# Patient Record
Sex: FEMALE | Race: WHITE | NOT HISPANIC OR LATINO | Employment: FULL TIME | URBAN - METROPOLITAN AREA
[De-identification: names, ages, dates, MRNs, and addresses within clinical notes are randomized per-mention and may not be internally consistent; named-entity substitution may affect disease eponyms.]

---

## 2023-06-28 ENCOUNTER — OFFICE VISIT (OUTPATIENT)
Dept: PRIMARY CARE | Facility: CLINIC | Age: 53
End: 2023-06-28
Payer: COMMERCIAL

## 2023-06-28 VITALS
DIASTOLIC BLOOD PRESSURE: 82 MMHG | BODY MASS INDEX: 27.48 KG/M2 | HEIGHT: 60 IN | HEART RATE: 83 BPM | SYSTOLIC BLOOD PRESSURE: 110 MMHG | OXYGEN SATURATION: 97 % | TEMPERATURE: 98.2 F | WEIGHT: 140 LBS | RESPIRATION RATE: 16 BRPM

## 2023-06-28 DIAGNOSIS — Z00.00 ENCOUNTER FOR ANNUAL PHYSICAL EXAM: Primary | ICD-10-CM

## 2023-06-28 DIAGNOSIS — M79.7 FIBROMYALGIA: ICD-10-CM

## 2023-06-28 DIAGNOSIS — Z91.09 ENVIRONMENTAL ALLERGIES: ICD-10-CM

## 2023-06-28 DIAGNOSIS — Z82.49 FAMILY HISTORY OF EARLY CAD: ICD-10-CM

## 2023-06-28 DIAGNOSIS — M85.80 OSTEOPENIA, UNSPECIFIED LOCATION: ICD-10-CM

## 2023-06-28 DIAGNOSIS — D12.6 ADENOMATOUS POLYP OF COLON, UNSPECIFIED PART OF COLON: ICD-10-CM

## 2023-06-28 DIAGNOSIS — R53.83 OTHER FATIGUE: ICD-10-CM

## 2023-06-28 PROCEDURE — 99396 PREV VISIT EST AGE 40-64: CPT | Performed by: FAMILY MEDICINE

## 2023-06-28 PROCEDURE — 1036F TOBACCO NON-USER: CPT | Performed by: FAMILY MEDICINE

## 2023-06-28 PROCEDURE — 99214 OFFICE O/P EST MOD 30 MIN: CPT | Performed by: FAMILY MEDICINE

## 2023-06-28 RX ORDER — ESTRADIOL 0.1 MG/G
2 CREAM VAGINAL DAILY
COMMUNITY

## 2023-06-28 RX ORDER — ASPIRIN 325 MG
1000 TABLET, DELAYED RELEASE (ENTERIC COATED) ORAL DAILY
COMMUNITY

## 2023-06-28 RX ORDER — GUAIFENESIN AND PHENYLEPHRINE HCL 400; 10 MG/1; MG/1
1 TABLET ORAL DAILY
COMMUNITY

## 2023-06-28 RX ORDER — ACETAMINOPHEN 500 MG
1 TABLET ORAL DAILY
COMMUNITY

## 2023-06-28 RX ORDER — CHOLECALCIFEROL (VITAMIN D3) 25 MCG
2500 TABLET,CHEWABLE ORAL DAILY
COMMUNITY

## 2023-06-28 RX ORDER — ASCORBIC ACID 500 MG
500 TABLET ORAL DAILY
COMMUNITY

## 2023-06-28 RX ORDER — LANOLIN ALCOHOL/MO/W.PET/CERES
100 CREAM (GRAM) TOPICAL 2 TIMES DAILY
COMMUNITY
End: 2024-05-28 | Stop reason: ALTCHOICE

## 2023-06-28 RX ORDER — IBUPROFEN 200 MG
200 TABLET ORAL EVERY 6 HOURS PRN
COMMUNITY

## 2023-06-28 RX ORDER — FLUTICASONE PROPIONATE 50 MCG
1 SPRAY, SUSPENSION (ML) NASAL DAILY
COMMUNITY

## 2023-06-28 ASSESSMENT — PATIENT HEALTH QUESTIONNAIRE - PHQ9
10. IF YOU CHECKED OFF ANY PROBLEMS, HOW DIFFICULT HAVE THESE PROBLEMS MADE IT FOR YOU TO DO YOUR WORK, TAKE CARE OF THINGS AT HOME, OR GET ALONG WITH OTHER PEOPLE: NOT DIFFICULT AT ALL
7. TROUBLE CONCENTRATING ON THINGS, SUCH AS READING THE NEWSPAPER OR WATCHING TELEVISION: NOT AT ALL
3. TROUBLE FALLING OR STAYING ASLEEP OR SLEEPING TOO MUCH: SEVERAL DAYS
8. MOVING OR SPEAKING SO SLOWLY THAT OTHER PEOPLE COULD HAVE NOTICED. OR THE OPPOSITE, BEING SO FIGETY OR RESTLESS THAT YOU HAVE BEEN MOVING AROUND A LOT MORE THAN USUAL: NOT AT ALL
1. LITTLE INTEREST OR PLEASURE IN DOING THINGS: NOT AT ALL
4. FEELING TIRED OR HAVING LITTLE ENERGY: NOT AT ALL
6. FEELING BAD ABOUT YOURSELF - OR THAT YOU ARE A FAILURE OR HAVE LET YOURSELF OR YOUR FAMILY DOWN: NOT AT ALL
9. THOUGHTS THAT YOU WOULD BE BETTER OFF DEAD, OR OF HURTING YOURSELF: NOT AT ALL
SUM OF ALL RESPONSES TO PHQ QUESTIONS 1-9: 1
5. POOR APPETITE OR OVEREATING: NOT AT ALL
SUM OF ALL RESPONSES TO PHQ9 QUESTIONS 1 AND 2: 0
2. FEELING DOWN, DEPRESSED OR HOPELESS: NOT AT ALL

## 2023-06-28 ASSESSMENT — ANXIETY QUESTIONNAIRES
7. FEELING AFRAID AS IF SOMETHING AWFUL MIGHT HAPPEN: NOT AT ALL
2. NOT BEING ABLE TO STOP OR CONTROL WORRYING: NOT AT ALL
IF YOU CHECKED OFF ANY PROBLEMS ON THIS QUESTIONNAIRE, HOW DIFFICULT HAVE THESE PROBLEMS MADE IT FOR YOU TO DO YOUR WORK, TAKE CARE OF THINGS AT HOME, OR GET ALONG WITH OTHER PEOPLE: NOT DIFFICULT AT ALL
4. TROUBLE RELAXING: SEVERAL DAYS
5. BEING SO RESTLESS THAT IT IS HARD TO SIT STILL: SEVERAL DAYS
6. BECOMING EASILY ANNOYED OR IRRITABLE: SEVERAL DAYS
1. FEELING NERVOUS, ANXIOUS, OR ON EDGE: NOT AT ALL
3. WORRYING TOO MUCH ABOUT DIFFERENT THINGS: NOT AT ALL
GAD7 TOTAL SCORE: 3

## 2023-06-28 NOTE — PROGRESS NOTES
Subjective   Bekcy Cox is a 53 y.o. female who presents for Establish Care.  HPI  PMH:   Pap NIL neg HPV 1/2020  Colon polyps, c-scope 1/2019 rpt 5 yr   Mammos GYN   DEXA 3/2023 T 1.5   Fibromyalgia Dx 20 yrs ago   Dad-MI 42  , Damien   Mental health counselor     Gluten free which has helped. Wonders if she has food allergies.   Walks regularly.   Manages fibro w supp, went to [facility] med and had supp adjustments in 2020.   Mood is good w self care techniques     Current Outpatient Medications on File Prior to Visit   Medication Sig Dispense Refill    ascorbic acid (Vitamin C) 500 mg tablet Take 1 tablet (500 mg) by mouth once daily.      cholecalciferol (Vitamin D3) 5,000 Units tablet Take 1 tablet (5,000 Units) by mouth once daily.      COENZYME Q10-L-CARNITINE ORAL Take 1 tablet by mouth once daily.      cyanocobalamin (Vitamin B-12) 2,500 mcg tablet Take 1 tablet (2,500 mcg) by mouth once daily.      diphenhydrAMINE HCL 12.5 mg tablet,disintegrating Take 1 tablet by mouth once daily.      estradiol (Estrace) 0.01 % (0.1 mg/gram) vaginal cream Insert 2 g into the vagina once daily.      fluticasone (Flonase) 50 mcg/actuation nasal spray Administer 1 spray into each nostril once daily. Shake gently. Before first use, prime pump. After use, clean tip and replace cap.      ibuprofen (AdviL) 200 mg tablet Take 1 tablet (200 mg) by mouth every 6 hours if needed for mild pain (1 - 3).      magnesium glycinate-mag oxide 120 mg magnesium capsule Take 1 capsule by mouth once daily.      omega-3 (Fish Oil) 60- mg capsule Take 2 capsules (1,000 mg) by mouth once daily.      thiamine 100 mg tablet Take 1 tablet (100 mg) by mouth 2 times a day.      turmeric root extract 500 mg capsule Take 1 Capful by mouth once daily.      ubidecarenone (COENZYME Q10, BULK, MISC) Take 1 capsule by mouth once daily.      vit C/zinc citrate/elderberry (ELDERBERRY IMMUNE HEALTH ORAL) Take 1 capsule by mouth once daily.        No current facility-administered medications on file prior to visit.        Patient Health Questionnaire-9 Score: 1           Objective   /82   Pulse 83   Temp 36.8 °C (98.2 °F)   Resp 16   Ht 1.524 m (5')   Wt 63.5 kg (140 lb)   SpO2 97%   BMI 27.34 kg/m²    Physical Exam  General: NAD  HEENT:NCAT, PERRLA, nml OP, b/l TM clear   Neck: no cervical MARSHALL  Heart: RRR no murmur, no edema   Lungs: CTA b/l, no wheeze or rhonchi   GI: abd soft, nontender, nondistended.   MSK: no c/c/e. nml gait   Skin: warm and dry  Psych: cooperative, appropriate affect  Neuro: speech clear. A&Ox3  Assessment/Plan   Problem List Items Addressed This Visit    None  Visit Diagnoses       Encounter for annual physical exam    -  Primary  CPE:overall doing well. Cont w balanced diet/reg ex   BMI: 27  VACC: reviewed, rec shingrix-considering tdap UTD   COLON CA SCRN: due 1/2024, call for RF   LABS: ordered   PAP: UTD rpt 1/2025  MAMMO: UTD per gyn   DEXA: 65  RTC yearly or prn for CPE w labs prior     Relevant Orders    Lipid Panel    CBC and Auto Differential    Comprehensive Metabolic Panel    Hemoglobin A1C    TSH with reflex to Free T4 if abnormal    Fibromyalgia      -stable w out meds  -reviewed supp. May not need addtl vit D and coEzQ 10     Family history of early CAD      -baseline Ca score d/t Fhx     Relevant Orders    CT cardiac scoring wo IV contrast    Osteopenia, unspecified location      -rec Ca 1200 mg in addition to already taking vit D     Environmental allergies      RF allergist for testing    Relevant Orders    Referral to Allergy    Adenomatous polyp of colon, unspecified part of colon      See above     Other fatigue      Check labs since on several vit     Relevant Orders    Vitamin D 25 hydroxy    Vitamin B12    Magnesium

## 2023-09-25 ENCOUNTER — LAB (OUTPATIENT)
Dept: LAB | Facility: LAB | Age: 53
End: 2023-09-25
Payer: COMMERCIAL

## 2023-09-25 ENCOUNTER — TELEPHONE (OUTPATIENT)
Dept: PRIMARY CARE | Facility: CLINIC | Age: 53
End: 2023-09-25

## 2023-09-25 DIAGNOSIS — Z00.00 ENCOUNTER FOR ANNUAL PHYSICAL EXAM: ICD-10-CM

## 2023-09-25 DIAGNOSIS — R53.83 OTHER FATIGUE: ICD-10-CM

## 2023-09-25 LAB
ALANINE AMINOTRANSFERASE (SGPT) (U/L) IN SER/PLAS: 21 U/L (ref 7–45)
ALBUMIN (G/DL) IN SER/PLAS: 4.3 G/DL (ref 3.4–5)
ALKALINE PHOSPHATASE (U/L) IN SER/PLAS: 69 U/L (ref 33–110)
ANION GAP IN SER/PLAS: 13 MMOL/L (ref 10–20)
ASPARTATE AMINOTRANSFERASE (SGOT) (U/L) IN SER/PLAS: 17 U/L (ref 9–39)
BASOPHILS (10*3/UL) IN BLOOD BY AUTOMATED COUNT: 0.06 X10E9/L (ref 0–0.1)
BASOPHILS/100 LEUKOCYTES IN BLOOD BY AUTOMATED COUNT: 0.7 % (ref 0–2)
BILIRUBIN TOTAL (MG/DL) IN SER/PLAS: 0.6 MG/DL (ref 0–1.2)
CALCIDIOL (25 OH VITAMIN D3) (NG/ML) IN SER/PLAS: 89 NG/ML
CALCIUM (MG/DL) IN SER/PLAS: 9.7 MG/DL (ref 8.6–10.6)
CARBON DIOXIDE, TOTAL (MMOL/L) IN SER/PLAS: 25 MMOL/L (ref 21–32)
CHLORIDE (MMOL/L) IN SER/PLAS: 107 MMOL/L (ref 98–107)
CHOLESTEROL (MG/DL) IN SER/PLAS: 231 MG/DL (ref 0–199)
CHOLESTEROL IN HDL (MG/DL) IN SER/PLAS: 41.5 MG/DL
CHOLESTEROL/HDL RATIO: 5.6
CREATININE (MG/DL) IN SER/PLAS: 0.92 MG/DL (ref 0.5–1.05)
EOSINOPHILS (10*3/UL) IN BLOOD BY AUTOMATED COUNT: 0.24 X10E9/L (ref 0–0.7)
EOSINOPHILS/100 LEUKOCYTES IN BLOOD BY AUTOMATED COUNT: 2.9 % (ref 0–6)
ERYTHROCYTE DISTRIBUTION WIDTH (RATIO) BY AUTOMATED COUNT: 12.6 % (ref 11.5–14.5)
ERYTHROCYTE MEAN CORPUSCULAR HEMOGLOBIN CONCENTRATION (G/DL) BY AUTOMATED: 32 G/DL (ref 32–36)
ERYTHROCYTE MEAN CORPUSCULAR VOLUME (FL) BY AUTOMATED COUNT: 95 FL (ref 80–100)
ERYTHROCYTES (10*6/UL) IN BLOOD BY AUTOMATED COUNT: 4.82 X10E12/L (ref 4–5.2)
ESTIMATED AVERAGE GLUCOSE FOR HBA1C: 100 MG/DL
GFR FEMALE: 73 ML/MIN/1.73M2
GLUCOSE (MG/DL) IN SER/PLAS: 99 MG/DL (ref 74–99)
HEMATOCRIT (%) IN BLOOD BY AUTOMATED COUNT: 46 % (ref 36–46)
HEMOGLOBIN (G/DL) IN BLOOD: 14.7 G/DL (ref 12–16)
HEMOGLOBIN A1C/HEMOGLOBIN TOTAL IN BLOOD: 5.1 %
IMMATURE GRANULOCYTES/100 LEUKOCYTES IN BLOOD BY AUTOMATED COUNT: 0.4 % (ref 0–0.9)
LDL: 165 MG/DL (ref 0–99)
LEUKOCYTES (10*3/UL) IN BLOOD BY AUTOMATED COUNT: 8.2 X10E9/L (ref 4.4–11.3)
LYMPHOCYTES (10*3/UL) IN BLOOD BY AUTOMATED COUNT: 2.39 X10E9/L (ref 1.2–4.8)
LYMPHOCYTES/100 LEUKOCYTES IN BLOOD BY AUTOMATED COUNT: 29.2 % (ref 13–44)
MAGNESIUM (MG/DL) IN SER/PLAS: 2.37 MG/DL (ref 1.6–2.4)
MONOCYTES (10*3/UL) IN BLOOD BY AUTOMATED COUNT: 0.6 X10E9/L (ref 0.1–1)
MONOCYTES/100 LEUKOCYTES IN BLOOD BY AUTOMATED COUNT: 7.3 % (ref 2–10)
NEUTROPHILS (10*3/UL) IN BLOOD BY AUTOMATED COUNT: 4.87 X10E9/L (ref 1.2–7.7)
NEUTROPHILS/100 LEUKOCYTES IN BLOOD BY AUTOMATED COUNT: 59.5 % (ref 40–80)
NRBC (PER 100 WBCS) BY AUTOMATED COUNT: 0 /100 WBC (ref 0–0)
PLATELETS (10*3/UL) IN BLOOD AUTOMATED COUNT: 268 X10E9/L (ref 150–450)
POTASSIUM (MMOL/L) IN SER/PLAS: 4.3 MMOL/L (ref 3.5–5.3)
PROTEIN TOTAL: 6.8 G/DL (ref 6.4–8.2)
SODIUM (MMOL/L) IN SER/PLAS: 141 MMOL/L (ref 136–145)
THYROTROPIN (MIU/L) IN SER/PLAS BY DETECTION LIMIT <= 0.05 MIU/L: 1.92 MIU/L (ref 0.44–3.98)
TRIGLYCERIDE (MG/DL) IN SER/PLAS: 121 MG/DL (ref 0–149)
UREA NITROGEN (MG/DL) IN SER/PLAS: 15 MG/DL (ref 6–23)
VLDL: 24 MG/DL (ref 0–40)

## 2023-09-25 PROCEDURE — 80061 LIPID PANEL: CPT

## 2023-09-25 PROCEDURE — 83735 ASSAY OF MAGNESIUM: CPT

## 2023-09-25 PROCEDURE — 82306 VITAMIN D 25 HYDROXY: CPT

## 2023-09-25 PROCEDURE — 84443 ASSAY THYROID STIM HORMONE: CPT

## 2023-09-25 PROCEDURE — 36415 COLL VENOUS BLD VENIPUNCTURE: CPT

## 2023-09-25 PROCEDURE — 83036 HEMOGLOBIN GLYCOSYLATED A1C: CPT

## 2023-09-25 PROCEDURE — 80053 COMPREHEN METABOLIC PANEL: CPT

## 2023-09-25 PROCEDURE — 85025 COMPLETE CBC W/AUTO DIFF WBC: CPT

## 2023-09-25 NOTE — TELEPHONE ENCOUNTER
Ca score is elevated but not severely. We need to discuss this further at an appt. Pls [facility] appt

## 2023-09-25 NOTE — TELEPHONE ENCOUNTER
Spoke with Pt and advised per Dr. Behm: Ca score is elevated but not severely. We need to discuss this further at an appt. Pls [facility] appt      Pt expressed understanding and was scheduled for an apt 10/12/2023

## 2023-10-12 ENCOUNTER — OFFICE VISIT (OUTPATIENT)
Dept: PRIMARY CARE | Facility: CLINIC | Age: 53
End: 2023-10-12
Payer: COMMERCIAL

## 2023-10-12 VITALS
WEIGHT: 139.4 LBS | OXYGEN SATURATION: 96 % | RESPIRATION RATE: 18 BRPM | TEMPERATURE: 97.8 F | HEART RATE: 78 BPM | BODY MASS INDEX: 27.37 KG/M2 | HEIGHT: 60 IN | DIASTOLIC BLOOD PRESSURE: 82 MMHG | SYSTOLIC BLOOD PRESSURE: 129 MMHG

## 2023-10-12 DIAGNOSIS — E78.2 MIXED HYPERLIPIDEMIA: Primary | ICD-10-CM

## 2023-10-12 PROCEDURE — 99214 OFFICE O/P EST MOD 30 MIN: CPT | Performed by: FAMILY MEDICINE

## 2023-10-12 PROCEDURE — 1036F TOBACCO NON-USER: CPT | Performed by: FAMILY MEDICINE

## 2023-10-12 NOTE — PROGRESS NOTES
Subjective   Becky Cox is a 53 y.o. female who presents for Follow-up (Follow up on blood work ).  HPI  Here for f/up on Ca score last mo.   Strong Fhx CAD   Trouble tolerating statin the past. Lipitor cause MAJOR body aches and could not function, caused FIBRO exac.     Current Outpatient Medications on File Prior to Visit   Medication Sig Dispense Refill    ascorbic acid (Vitamin C) 500 mg tablet Take 1 tablet (500 mg) by mouth once daily.      cholecalciferol (Vitamin D3) 5,000 Units tablet Take 1 tablet (5,000 Units) by mouth once daily.      COENZYME Q10-L-CARNITINE ORAL Take 1 tablet by mouth once daily.      cyanocobalamin (Vitamin B-12) 2,500 mcg tablet Take 1 tablet (2,500 mcg) by mouth once daily.      diphenhydrAMINE HCL 12.5 mg tablet,disintegrating Take 1 tablet by mouth once daily.      estradiol (Estrace) 0.01 % (0.1 mg/gram) vaginal cream Insert 2 g into the vagina once daily.      fluticasone (Flonase) 50 mcg/actuation nasal spray Administer 1 spray into each nostril once daily. Shake gently. Before first use, prime pump. After use, clean tip and replace cap.      ibuprofen (AdviL) 200 mg tablet Take 1 tablet (200 mg) by mouth every 6 hours if needed for mild pain (1 - 3).      magnesium glycinate-mag oxide 120 mg magnesium capsule Take 1 capsule by mouth once daily.      omega-3 (Fish Oil) 60- mg capsule Take 2 capsules (1,000 mg) by mouth once daily.      thiamine 100 mg tablet Take 1 tablet (100 mg) by mouth 2 times a day.      turmeric root extract 500 mg capsule Take 1 Capful by mouth once daily.      ubidecarenone (COENZYME Q10, BULK, MISC) Take 1 capsule by mouth once daily.      vit C/zinc citrate/elderberry (ELDERBERRY IMMUNE HEALTH ORAL) Take 1 capsule by mouth once daily.       No current facility-administered medications on file prior to visit.                  Objective   /82   Pulse 78   Temp 36.6 °C (97.8 °F)   Resp 18   Ht 1.524 m (5')   Wt 63.2 kg (139 lb  6.4 oz)   SpO2 96%   BMI 27.22 kg/m²    Physical Exam  General: NAD  HEENT:NCAT, PERRLA  Heart: RRR no murmur, no edema   Lungs: CTA b/l, no wheeze or rhonchi   MSK: no c/c/e. nml gait   Skin: warm and dry  Psych: cooperative, appropriate affect  Neuro: speech clear. A&Ox3  Assessment/Plan   Problem List Items Addressed This Visit    None  Visit Diagnoses       Mixed hyperlipidemia    -  Primary  -Ca score 225  -While not severely elevated, statin therapy is indicated based off her lipid levels and predominace of Ca score in her LAD. Very strong Fhx CAD w dad in 40s  -Statin intol and caused DEBILTATING fatigue and myalgias in past  -will start red yeast rice  -motivated to make lifestyle changes w Ex and med diet  -f/up 3 mo labs prior   -also RF to cards for lipid mgnt     Relevant Orders    Referral to Cardiology    Lipid Panel

## 2023-12-18 PROBLEM — E55.9 VITAMIN D DEFICIENCY: Status: ACTIVE | Noted: 2023-12-18

## 2023-12-18 PROBLEM — N95.2 ATROPHIC VAGINITIS: Status: ACTIVE | Noted: 2023-12-18

## 2023-12-18 PROBLEM — Z98.890 S/P BILATERAL BREAST REDUCTION: Status: ACTIVE | Noted: 2023-12-18

## 2023-12-18 PROBLEM — M54.9 BACK PAIN: Status: ACTIVE | Noted: 2023-12-18

## 2023-12-18 PROBLEM — H10.45 CHRONIC ALLERGIC CONJUNCTIVITIS: Status: ACTIVE | Noted: 2023-12-18

## 2023-12-18 PROBLEM — G93.32 CHRONIC FATIGUE SYNDROME: Status: ACTIVE | Noted: 2023-12-18

## 2023-12-18 PROBLEM — R76.8 POSITIVE ANA (ANTINUCLEAR ANTIBODY): Status: ACTIVE | Noted: 2023-12-18

## 2023-12-18 PROBLEM — E78.2 MIXED HYPERLIPIDEMIA: Status: ACTIVE | Noted: 2023-12-18

## 2023-12-18 PROBLEM — M79.7 FIBROMYALGIA: Status: ACTIVE | Noted: 2023-12-18

## 2023-12-20 ENCOUNTER — OFFICE VISIT (OUTPATIENT)
Dept: CARDIOLOGY | Facility: CLINIC | Age: 53
End: 2023-12-20
Payer: COMMERCIAL

## 2023-12-20 VITALS
HEIGHT: 60 IN | WEIGHT: 138.2 LBS | SYSTOLIC BLOOD PRESSURE: 122 MMHG | DIASTOLIC BLOOD PRESSURE: 84 MMHG | OXYGEN SATURATION: 94 % | HEART RATE: 63 BPM | BODY MASS INDEX: 27.13 KG/M2

## 2023-12-20 DIAGNOSIS — R93.1 ELEVATED CORONARY ARTERY CALCIUM SCORE: ICD-10-CM

## 2023-12-20 DIAGNOSIS — Z78.9 STATIN INTOLERANCE: ICD-10-CM

## 2023-12-20 DIAGNOSIS — E78.2 MIXED HYPERLIPIDEMIA: Primary | ICD-10-CM

## 2023-12-20 PROCEDURE — 1036F TOBACCO NON-USER: CPT | Performed by: INTERNAL MEDICINE

## 2023-12-20 PROCEDURE — 93005 ELECTROCARDIOGRAM TRACING: CPT | Performed by: INTERNAL MEDICINE

## 2023-12-20 PROCEDURE — 99214 OFFICE O/P EST MOD 30 MIN: CPT | Mod: 25 | Performed by: INTERNAL MEDICINE

## 2023-12-20 PROCEDURE — 99204 OFFICE O/P NEW MOD 45 MIN: CPT | Performed by: INTERNAL MEDICINE

## 2023-12-20 PROCEDURE — 93010 ELECTROCARDIOGRAM REPORT: CPT | Performed by: INTERNAL MEDICINE

## 2023-12-20 RX ORDER — BEMPEDOIC ACID AND EZETIMIBE 180; 10 MG/1; MG/1
1 TABLET, FILM COATED ORAL DAILY
Qty: 30 TABLET | Refills: 11 | Status: SHIPPED | OUTPATIENT
Start: 2023-12-20 | End: 2024-01-15

## 2023-12-20 RX ORDER — TANNIC/CHESTNUT/PEPPERMINT 275 MG
275 CAPSULE ORAL 2 TIMES DAILY
COMMUNITY
End: 2024-05-28 | Stop reason: ALTCHOICE

## 2023-12-20 RX ORDER — NAPROXEN SODIUM 220 MG/1
81 TABLET, FILM COATED ORAL DAILY
Qty: 30 TABLET | Refills: 11
Start: 2023-12-20 | End: 2024-12-19

## 2023-12-20 RX ORDER — UBIDECARENONE 50 MG
600 CAPSULE ORAL 2 TIMES DAILY
COMMUNITY
End: 2024-05-28 | Stop reason: ALTCHOICE

## 2023-12-20 ASSESSMENT — ENCOUNTER SYMPTOMS
EYES NEGATIVE: 1
ENDOCRINE NEGATIVE: 1
MUSCULOSKELETAL NEGATIVE: 1
ALLERGIC/IMMUNOLOGIC NEGATIVE: 1
CARDIOVASCULAR NEGATIVE: 1
PSYCHIATRIC NEGATIVE: 1
DEPRESSION: 0
GASTROINTESTINAL NEGATIVE: 1
NEUROLOGICAL NEGATIVE: 1
OCCASIONAL FEELINGS OF UNSTEADINESS: 0
LOSS OF SENSATION IN FEET: 0
CONSTITUTIONAL NEGATIVE: 1
HEMATOLOGIC/LYMPHATIC NEGATIVE: 1
RESPIRATORY NEGATIVE: 1

## 2023-12-20 ASSESSMENT — PAIN SCALES - GENERAL: PAINLEVEL: 8

## 2023-12-20 ASSESSMENT — PATIENT HEALTH QUESTIONNAIRE - PHQ9
2. FEELING DOWN, DEPRESSED OR HOPELESS: NOT AT ALL
1. LITTLE INTEREST OR PLEASURE IN DOING THINGS: NOT AT ALL
SUM OF ALL RESPONSES TO PHQ9 QUESTIONS 1 AND 2: 0

## 2023-12-20 ASSESSMENT — COLUMBIA-SUICIDE SEVERITY RATING SCALE - C-SSRS
6. HAVE YOU EVER DONE ANYTHING, STARTED TO DO ANYTHING, OR PREPARED TO DO ANYTHING TO END YOUR LIFE?: NO
2. HAVE YOU ACTUALLY HAD ANY THOUGHTS OF KILLING YOURSELF?: NO
1. IN THE PAST MONTH, HAVE YOU WISHED YOU WERE DEAD OR WISHED YOU COULD GO TO SLEEP AND NOT WAKE UP?: NO

## 2023-12-20 NOTE — PROGRESS NOTES
Preventive Cardiology Clinic Note    Reason for Visit: Elevated coronary artery calcium score  Referring Clinician: Behm     History of Present Illness: Becky Cox is a 53 y.o. female with hyperlipidemia and elevated coronary artery calcium score and family history of premature coronary artery disease who was referred by her PCP for cardiovascular risk discussion.  She does not have a personal history of cardiovascular disease and does not have any exertional chest pain, shortness of breath, palpitations, or syncope.  She exercises daily by walking and does not have any cardiopulmonary limitation.  She does not have a history of hypertension or diabetes but she does have longstanding hyperlipidemia and prior history of statin intolerance to atorvastatin due to myalgias.  Both her parents had premature coronary artery disease in their 40s and both were smokers.  She is a non-smoker.  She recently had a coronary calcium score performed that was 225.    Past Medical History:  She has a past medical history of Cellulitis, unspecified (09/18/2014), Encounter for screening for malignant neoplasm of cervix (04/04/2016), Mastodynia (09/18/2014), Personal history of other diseases of the respiratory system (10/14/2013), Personal history of other specified conditions (07/27/2015), Personal history of other specified conditions (10/14/2013), Personal history of other specified conditions (02/06/2014), and Personal history of urinary (tract) infections (10/14/2013).    Past Surgical History:  She has a past surgical history that includes Tonsillectomy (12/05/2014); Breast surgery (11/06/2017); and Other surgical history (11/13/2018).    Social History:  She reports that she has never smoked. She has never been exposed to tobacco smoke. She has never used smokeless tobacco. She reports current alcohol use of about 3.0 standard drinks of alcohol per week. She reports that she does not use drugs.    Family  History:  Premature CAD in both parents.    Allergies:  Codeine, Penicillins, and Sulfa (sulfonamide antibiotics)    Outpatient Medications:  Current Outpatient Medications   Medication Instructions    ascorbic acid (VITAMIN C) 500 mg, oral, Daily    cholecalciferol (Vitamin D3) 5,000 Units tablet 1 tablet, oral, Daily    cholesterol control,high,low solution miscellaneous    COENZYME Q10-L-CARNITINE ORAL 1 tablet, oral, Daily    cyanocobalamin (VITAMIN B-12) 2,500 mcg, oral, Daily    diphenhydrAMINE HCL 12.5 mg tablet,disintegrating 1 tablet, oral, Daily    estradiol (ESTRACE) 2 g, vaginal, Daily    fluticasone (Flonase) 50 mcg/actuation nasal spray 1 spray, Each Nostril, Daily, Shake gently. Before first use, prime pump. After use, clean tip and replace cap.    ibuprofen (ADVIL) 200 mg, oral, Every 6 hours PRN    magnesium glycinate-mag oxide 120 mg magnesium capsule 1 capsule, oral, Daily    omega-3 (Fish Oil) 60- mg capsule 1,000 mg, oral, Daily    red yeast rice 600 mg, oral, 2 times daily    tannic-chestnut-peppermint (Atrantil) 275 mg capsule 275 mg, oral, 2 times daily    thiamine (VITAMIN B-1) 100 mg, oral, 2 times daily    turmeric root extract 500 mg capsule 1 Capful, oral, Daily    ubidecarenone (COENZYME Q10, BULK, MISC) 1 capsule, oral, Daily    vit C/zinc citrate/elderberry (ELDERBERRY IMMUNE HEALTH ORAL) 1 capsule, oral, Daily       Review of Systems:  Review of Systems   Constitutional: Negative.   HENT: Negative.     Eyes: Negative.    Cardiovascular: Negative.    Respiratory: Negative.     Endocrine: Negative.    Hematologic/Lymphatic: Negative.    Skin: Negative.    Musculoskeletal: Negative.    Gastrointestinal: Negative.    Genitourinary: Negative.    Neurological: Negative.    Psychiatric/Behavioral: Negative.     Allergic/Immunologic: Negative.        Last Recorded Vitals:  Vitals:    12/20/23 1123   BP: 122/84   BP Location: Right arm   Patient Position: Sitting   BP Cuff Size: Adult  "  Pulse: 63   SpO2: 94%   Weight: 62.7 kg (138 lb 3.2 oz)   Height: 1.524 m (5')       Physical Examination:  General: Well appearing, well-nourished, in no acute distress.  HEENT: Normocephalic atraumatic, pupils equal and reactive to light, extraocular muscles intact, no conjunctival injection, oropharynx clear without exudates.  Neck: Normal carotid arterial pulses, no arterial bruits, no thyromegaly.  Cardiac: Regular rhythm and normal heart rate.  S1, S2 present and normal.  No murmurs, rubs, or gallops.  PMI is nondisplaced. Jugular venous pulsations are normal.  Pulmonary: Normal breath sounds, no increased work of breathing, no wheezes or crackles.  GI: Normal bowel sounds, abdominal aorta not enlarged, no hepatosplenomegaly, no abdominal bruits.  Lower extremities: No cyanosis, clubbing, or edema.  No xanthelasma present. Normal distal pulses.  Skin: Skin intact. No significant rashes or lesions present.  Neuro: Alert and oriented x 3, normal attention and cognition, no focal motor or sensory neurologic deficits.  Psych: Normal affect and mood.  Musculoskeletal: Normal gait normal muscle tone.    Laboratory Studies:  Lab Results   Component Value Date    GLUCOSE 99 09/25/2023    CALCIUM 9.7 09/25/2023     09/25/2023    K 4.3 09/25/2023    CO2 25 09/25/2023     09/25/2023    BUN 15 09/25/2023    CREATININE 0.92 09/25/2023     Lab Results   Component Value Date    ALT 21 09/25/2023    AST 17 09/25/2023    ALKPHOS 69 09/25/2023    BILITOT 0.6 09/25/2023         Lab Results   Component Value Date    CHOL 231 (H) 09/25/2023    CHOL 226 (H) 06/20/2018     Lab Results   Component Value Date    HDL 41.5 09/25/2023    HDL 39.1 (A) 06/20/2018     No results found for: \"LDLCALC\"  Lab Results   Component Value Date    TRIG 121 09/25/2023    TRIG 137 06/20/2018     No components found for: \"CHOLHDL\"  Lab Results   Component Value Date    HGBA1C 5.1 09/25/2023     LDL CALC 165 mg/dl    Cardiology Tests:  ECG " in the office today 12/20/2023 shows normal sinus rhythm, ventricular rate 61 bpm, normal intervals and axis, normal ECG.    Cardiac Imaging:  CT cardiac scoring wo IV contrast 09/22/2023  LM 0,  .53,  LCx 0,  RCA 0.49,  Total   225.02    The estimated 10-year risk of a CHD event for a person with this risk factor profile including coronary calcium is 7.0%. The estimated 10-year risk of a CHD event for a person with this risk factor profile if we did not factor in their coronary calcium score would be 2.4%.      Assessment and Plan:  Problem List Items Addressed This Visit          Cardiac and Vasculature    Mixed hyperlipidemia - Primary    Relevant Orders    ECG 12 lead (Clinic Performed)     Other Visit Diagnoses       Elevated coronary artery calcium score              Becky Cox is a 53 y.o. female with hyperlipidemia and elevated coronary artery calcium score and family history of premature coronary artery disease who was referred by her PCP for cardiovascular risk discussion.  She is at overall intermediate 10-year risk for ASCVD although she has several risk enhancers including a strong family history and an elevated coronary calcium score.  I counseled her regarding preventive therapies for ASCVD risk reduction and I recommend the addition of low-dose aspirin daily (given low bleeding risk) and lipid-lowering therapy with a goal LDL cholesterol less than 100 mg/dL.  We discussed an alternative statin such as rosuvastatin versus a nonstatin alternative such as bempedoic acid/ezetimibe.  I described the risks and benefits of medications and potential side effects.  She will try bempedoic acid/ezetimibe and I will recheck her lipid panel and uric acid level in 3 months.  I will contact her with the results to discuss any further medication adjustment as needed.  We also discussed lifestyle including physical activity and the Mediterranean and DASH diet.  She will continue to follow-up with her  primary care doctor and I will contact her in 3 months with results of testing and further recommendations.  Please do not hesitate to call or contact me with questions or concerns.    Ashok Nielson MD, FAAVELINA, [facility]  Director,  [facility] Cardiovascular Prevention  Director,  Vital Access Program  Associate Professor of Medicine  [employer]

## 2023-12-26 LAB
ATRIAL RATE: 61 BPM
P AXIS: 65 DEGREES
P OFFSET: 201 MS
P ONSET: 149 MS
PR INTERVAL: 144 MS
Q ONSET: 221 MS
QRS COUNT: 10 BEATS
QRS DURATION: 74 MS
QT INTERVAL: 432 MS
QTC CALCULATION(BAZETT): 434 MS
QTC FREDERICIA: 434 MS
R AXIS: 45 DEGREES
T AXIS: 52 DEGREES
T OFFSET: 437 MS
VENTRICULAR RATE: 61 BPM

## 2024-01-03 ENCOUNTER — TELEPHONE (OUTPATIENT)
Dept: PRIMARY CARE | Facility: CLINIC | Age: 54
End: 2024-01-03
Payer: COMMERCIAL

## 2024-01-03 NOTE — TELEPHONE ENCOUNTER
"Cancel 1/12 \"3mo HLD labs\" apptmt?   Pt sched w cardiologist 3/20; he is putting her on an Rx designed to lower cholesterol & he will test lipids prior to that apptmt.  She currently has only been on Rx for one week.  "

## 2024-01-12 ENCOUNTER — APPOINTMENT (OUTPATIENT)
Dept: PRIMARY CARE | Facility: CLINIC | Age: 54
End: 2024-01-12
Payer: COMMERCIAL

## 2024-01-14 DIAGNOSIS — Z78.9 STATIN INTOLERANCE: ICD-10-CM

## 2024-01-14 DIAGNOSIS — R93.1 ELEVATED CORONARY ARTERY CALCIUM SCORE: ICD-10-CM

## 2024-01-14 DIAGNOSIS — E78.2 MIXED HYPERLIPIDEMIA: ICD-10-CM

## 2024-01-15 RX ORDER — BEMPEDOIC ACID AND EZETIMIBE 180; 10 MG/1; MG/1
1 TABLET, FILM COATED ORAL DAILY
Qty: 90 TABLET | Refills: 3 | Status: SHIPPED | OUTPATIENT
Start: 2024-01-15 | End: 2024-05-28 | Stop reason: SDUPTHER

## 2024-03-20 ENCOUNTER — APPOINTMENT (OUTPATIENT)
Dept: CARDIOLOGY | Facility: CLINIC | Age: 54
End: 2024-03-20
Payer: COMMERCIAL

## 2024-03-27 ENCOUNTER — APPOINTMENT (OUTPATIENT)
Dept: CARDIOLOGY | Facility: CLINIC | Age: 54
End: 2024-03-27
Payer: COMMERCIAL

## 2024-05-08 ENCOUNTER — TELEPHONE (OUTPATIENT)
Dept: OBSTETRICS AND GYNECOLOGY | Facility: CLINIC | Age: 54
End: 2024-05-08
Payer: COMMERCIAL

## 2024-05-08 ENCOUNTER — APPOINTMENT (OUTPATIENT)
Dept: CARDIOLOGY | Facility: CLINIC | Age: 54
End: 2024-05-08
Payer: COMMERCIAL

## 2024-05-28 ENCOUNTER — TELEPHONE (OUTPATIENT)
Dept: SCHEDULING | Age: 54
End: 2024-05-28
Payer: COMMERCIAL

## 2024-05-28 DIAGNOSIS — R93.1 ELEVATED CORONARY ARTERY CALCIUM SCORE: ICD-10-CM

## 2024-05-28 DIAGNOSIS — Z78.9 STATIN INTOLERANCE: ICD-10-CM

## 2024-05-28 DIAGNOSIS — E78.2 MIXED HYPERLIPIDEMIA: ICD-10-CM

## 2024-05-28 RX ORDER — BEMPEDOIC ACID AND EZETIMIBE 180; 10 MG/1; MG/1
1 TABLET, FILM COATED ORAL DAILY
Qty: 90 TABLET | Refills: 3 | Status: SHIPPED | OUTPATIENT
Start: 2024-05-28

## 2024-05-29 ENCOUNTER — APPOINTMENT (OUTPATIENT)
Dept: CARDIOLOGY | Facility: CLINIC | Age: 54
End: 2024-05-29
Payer: COMMERCIAL

## 2024-06-24 ENCOUNTER — APPOINTMENT (OUTPATIENT)
Dept: OBSTETRICS AND GYNECOLOGY | Facility: CLINIC | Age: 54
End: 2024-06-24
Payer: COMMERCIAL

## 2024-06-24 VITALS
BODY MASS INDEX: 26.43 KG/M2 | SYSTOLIC BLOOD PRESSURE: 120 MMHG | WEIGHT: 140 LBS | DIASTOLIC BLOOD PRESSURE: 72 MMHG | HEIGHT: 61 IN

## 2024-06-24 DIAGNOSIS — N95.2 VAGINAL ATROPHY: ICD-10-CM

## 2024-06-24 DIAGNOSIS — Z12.4 CERVICAL CANCER SCREENING: ICD-10-CM

## 2024-06-24 DIAGNOSIS — Z12.31 VISIT FOR SCREENING MAMMOGRAM: ICD-10-CM

## 2024-06-24 DIAGNOSIS — Z01.419 WELL WOMAN EXAM: Primary | ICD-10-CM

## 2024-06-24 PROCEDURE — 99396 PREV VISIT EST AGE 40-64: CPT | Performed by: OBSTETRICS & GYNECOLOGY

## 2024-06-24 PROCEDURE — 1036F TOBACCO NON-USER: CPT | Performed by: OBSTETRICS & GYNECOLOGY

## 2024-06-24 RX ORDER — LACTOBACILLUS COMBINATION NO.4 3B CELL
CAPSULE ORAL
COMMUNITY

## 2024-06-24 RX ORDER — ESTRADIOL 0.1 MG/G
2 CREAM VAGINAL DAILY
Qty: 42.5 G | Refills: 3 | Status: SHIPPED | OUTPATIENT
Start: 2024-06-24

## 2024-06-24 RX ORDER — FEXOFENADINE HCL 60 MG
60 TABLET ORAL DAILY
COMMUNITY

## 2024-06-24 ASSESSMENT — ENCOUNTER SYMPTOMS
CONSTITUTIONAL NEGATIVE: 1
NEUROLOGICAL NEGATIVE: 1
CARDIOVASCULAR NEGATIVE: 1
RESPIRATORY NEGATIVE: 1
PSYCHIATRIC NEGATIVE: 1
GASTROINTESTINAL NEGATIVE: 1
MUSCULOSKELETAL NEGATIVE: 1

## 2024-06-24 NOTE — PATIENT INSTRUCTIONS
Routine Gynecologic Visit:  You were seen today for a routine gyn visit with normal findings on exam  Your pap smear had normal cells and was negative for HPV in 2020, so you were not due for a pap smear today. You should still continue to get annual breast and pelvic exams in the office.  An order was placed in the system for mammogram. Please get done at your earliest convenience  If you are having any gynecologic issues in the next year you should call the office. [phone] ([place]) or [phone] ([place])

## 2024-06-24 NOTE — PROGRESS NOTES
"Subjective   Becky Cox is a 54 y.o. female who presents for annual exam. The patient has no complaints today. The patient is sexually active. GYN screening history: last pap: was normal. The patient is taking vaginal hormone replacement therapy. Patient denies post-menopausal vaginal bleeding.. The patient wears seatbelts: yes. The patient participates in regular exercise: yes. Has the patient ever been transfused or tattooed?: no. The patient reports that there is not domestic violence in their life.     Menstrual History:  OB History          2    Para   2    Term                AB        Living   2         SAB        IAB        Ectopic        Multiple        Live Births   2               No LMP recorded. Patient is postmenopausal.         Review of Systems   Constitutional: Negative.    Respiratory: Negative.     Cardiovascular: Negative.    Gastrointestinal: Negative.    Genitourinary: Negative.    Musculoskeletal: Negative.    Skin: Negative.    Neurological: Negative.    Psychiatric/Behavioral: Negative.          Objective   /72   Ht 1.537 m (5' 0.5\")   Wt 63.5 kg (140 lb)   BMI 26.89 kg/m²     General:   alert and oriented, in no acute distress   Heart: regular rate and rhythm, S1, S2 normal, no murmur, click, rub or gallop   Lungs: clear to auscultation bilaterally   Abdomen: soft, non-tender, without masses or organomegaly   Pelvic: Vulva normal in appearance without discoloration, rashes, lesions. Vagina is negative for blood, discharge, lesions. Uterus is small, mobile, non tender. There is no cervical motion tenderness, adnexal masses/tenderness.    Breast: No masses, skin changes, discoloration, tenderness, or nipple drainage bilaterally     Neck: Normal ROM. Thyroid normal size. No masses/tenderness     Lymph: No LAD   Psych: Normal mood/affect     Lab Review      Assessment/Plan   Problem List Items Addressed This Visit    None  Visit Diagnoses         Codes    Well " woman exam    -  Primary Z01.419    Vaginal atrophy     N95.2    Relevant Medications    estradiol (Estrace) 0.01 % (0.1 mg/gram) vaginal cream    Cervical cancer screening     Z12.4    Visit for screening mammogram     Z12.31    Relevant Orders    BI mammo bilateral screening tomosynthesis        1. Pelvic and breast exam wnl  2. Rec for mammogram given, counseled in breast awareness/self exam  3. NIL/neg 2020   4. Colonoscopy up to date  5. Patient asymptomatic without PMB. Estrace Rx refill sent. Dexa up to date    RTO 1 year  Elizabeth Donald DO

## 2024-07-08 ENCOUNTER — HOSPITAL ENCOUNTER (OUTPATIENT)
Dept: RADIOLOGY | Facility: HOSPITAL | Age: 54
Discharge: HOME | End: 2024-07-08
Payer: COMMERCIAL

## 2024-07-08 VITALS — HEIGHT: 60 IN | BODY MASS INDEX: 27.48 KG/M2 | WEIGHT: 140 LBS

## 2024-07-08 DIAGNOSIS — Z12.31 VISIT FOR SCREENING MAMMOGRAM: ICD-10-CM

## 2024-07-08 PROCEDURE — 77067 SCR MAMMO BI INCL CAD: CPT | Performed by: RADIOLOGY

## 2024-07-08 PROCEDURE — 77067 SCR MAMMO BI INCL CAD: CPT

## 2024-07-08 PROCEDURE — 77063 BREAST TOMOSYNTHESIS BI: CPT | Performed by: RADIOLOGY

## 2024-07-09 ENCOUNTER — TELEMEDICINE (OUTPATIENT)
Dept: PRIMARY CARE | Facility: CLINIC | Age: 54
End: 2024-07-09
Payer: COMMERCIAL

## 2024-07-09 ENCOUNTER — TELEPHONE (OUTPATIENT)
Dept: PRIMARY CARE | Facility: CLINIC | Age: 54
End: 2024-07-09

## 2024-07-09 DIAGNOSIS — L23.7 POISON IVY DERMATITIS: Primary | ICD-10-CM

## 2024-07-09 PROCEDURE — 99213 OFFICE O/P EST LOW 20 MIN: CPT | Performed by: FAMILY MEDICINE

## 2024-07-09 RX ORDER — TRIAMCINOLONE ACETONIDE 1 MG/G
CREAM TOPICAL 3 TIMES DAILY PRN
Qty: 30 G | Refills: 1 | Status: SHIPPED | OUTPATIENT
Start: 2024-07-09

## 2024-07-09 NOTE — PROGRESS NOTES
Subjective   Becky Cox is a 54 y.o. female who presents for No chief complaint on file..  HPI    Itchy rash started 6 d ago on R side of abd, small spot inner R eye, and painful area/rash on L calf.   Min pain   Using OTC poison ivy/cortisone cream     An interactive audio and video telecommunication system which permits real time communications between the patient  (at the originating site) and provider (at the distant site) was utilized to provide this telehealth service. Verbal consent was requested and obtained today for a telehealth visit.  Current Outpatient Medications on File Prior to Visit   Medication Sig Dispense Refill    ascorbic acid (Vitamin C) 500 mg tablet Take 1 tablet (500 mg) by mouth once daily.      aspirin 81 mg chewable tablet Chew 1 tablet (81 mg) once daily. 30 tablet 11    bempedoic acid-ezetimibe (Nexlizet) 180-10 mg tablet Take 1 tablet by mouth once daily. 90 tablet 3    cholecalciferol (Vitamin D3) 5,000 Units tablet Take 1 tablet (5,000 Units) by mouth once daily.      cyanocobalamin (Vitamin B-12) 2,500 mcg tablet Take 1 tablet (2,500 mcg) by mouth once daily.      estradiol (Estrace) 0.01 % (0.1 mg/gram) vaginal cream Insert 0.5 Applicatorfuls (2 g) into the vagina once daily. 42.5 g 3    fexofenadine (Allegra) 60 mg tablet Take 1 tablet (60 mg) by mouth once daily.      fluticasone (Flonase) 50 mcg/actuation nasal spray Administer 1 spray into each nostril once daily. Shake gently. Before first use, prime pump. After use, clean tip and replace cap.      ibuprofen (AdviL) 200 mg tablet Take 1 tablet (200 mg) by mouth every 6 hours if needed for mild pain (1 - 3).      lactobacillus combination no.4 (Probiotic) 3 billion cell capsule Take by mouth.      magnesium glycinate-mag oxide 120 mg magnesium capsule Take 1 capsule by mouth once daily.      omega-3 (Fish Oil) 60- mg capsule Take 2 capsules (1,000 mg) by mouth once daily.      turmeric root extract 500 mg  capsule Take 1 Capful by mouth once daily.      ubidecarenone (COENZYME Q10, BULK, MISC) Take 1 capsule by mouth once daily.      vit C/zinc citrate/elderberry (ELDERBERRY IMMUNE HEALTH ORAL) Take 1 capsule by mouth once daily.       No current facility-administered medications on file prior to visit.                  Objective   There were no vitals taken for this visit.   Physical Exam  General: NAD  HEENT:NCAT  Lungs: no audible wheeze or rhonchi   Skin: R abd from lower ribs to upper iliac crest patchy dark red rash  Linear rash on L calf   unable to appreciate eye lesion   Psych: cooperative, appropriate affect  Neuro: speech clear. A&Ox3    Assessment/Plan   Problem List Items Addressed This Visit    None  Visit Diagnoses       Poison ivy dermatitis    -  Primary  Considered shingles but does not follow typical dermatome pattern and out of window for antiviral Tx since >72 hrs  More c/w poison ivy  Tx triamcinolone cream  Did discuss shingrix which she will consider     Relevant Medications    triamcinolone (Kenalog) 0.1 % cream

## 2024-07-09 NOTE — TELEPHONE ENCOUNTER
Pt caleb shingles:  Onset 7/4  bright red, itchy rash on stomach/side, spreading to back.  Also spot near eye.  Pain on back & left leg.

## 2024-07-18 ENCOUNTER — LAB (OUTPATIENT)
Dept: LAB | Facility: LAB | Age: 54
End: 2024-07-18
Payer: COMMERCIAL

## 2024-07-18 DIAGNOSIS — R93.1 ELEVATED CORONARY ARTERY CALCIUM SCORE: ICD-10-CM

## 2024-07-18 DIAGNOSIS — Z78.9 STATIN INTOLERANCE: ICD-10-CM

## 2024-07-18 DIAGNOSIS — E78.2 MIXED HYPERLIPIDEMIA: ICD-10-CM

## 2024-07-18 LAB
CHOLEST SERPL-MCNC: 147 MG/DL (ref 0–199)
CHOLESTEROL/HDL RATIO: 3.7
HDLC SERPL-MCNC: 39.4 MG/DL
LDLC SERPL CALC-MCNC: 79 MG/DL
NON HDL CHOLESTEROL: 108 MG/DL (ref 0–149)
TRIGL SERPL-MCNC: 142 MG/DL (ref 0–149)
URATE SERPL-MCNC: 6.8 MG/DL (ref 2.3–6.7)
VLDL: 28 MG/DL (ref 0–40)

## 2024-07-18 PROCEDURE — 80061 LIPID PANEL: CPT

## 2024-07-18 PROCEDURE — 84550 ASSAY OF BLOOD/URIC ACID: CPT

## 2024-07-18 PROCEDURE — 36415 COLL VENOUS BLD VENIPUNCTURE: CPT

## 2024-07-24 ENCOUNTER — OFFICE VISIT (OUTPATIENT)
Dept: CARDIOLOGY | Facility: CLINIC | Age: 54
End: 2024-07-24
Payer: COMMERCIAL

## 2024-07-24 VITALS
HEART RATE: 73 BPM | BODY MASS INDEX: 27.36 KG/M2 | HEIGHT: 60 IN | OXYGEN SATURATION: 97 % | WEIGHT: 139.38 LBS | SYSTOLIC BLOOD PRESSURE: 120 MMHG | DIASTOLIC BLOOD PRESSURE: 82 MMHG

## 2024-07-24 DIAGNOSIS — E78.2 MIXED HYPERLIPIDEMIA: Primary | ICD-10-CM

## 2024-07-24 DIAGNOSIS — R93.1 ELEVATED CORONARY ARTERY CALCIUM SCORE: ICD-10-CM

## 2024-07-24 DIAGNOSIS — Z78.9 STATIN INTOLERANCE: ICD-10-CM

## 2024-07-24 PROCEDURE — 3008F BODY MASS INDEX DOCD: CPT | Performed by: INTERNAL MEDICINE

## 2024-07-24 PROCEDURE — 99214 OFFICE O/P EST MOD 30 MIN: CPT | Performed by: INTERNAL MEDICINE

## 2024-07-24 PROCEDURE — 1036F TOBACCO NON-USER: CPT | Performed by: INTERNAL MEDICINE

## 2024-07-24 ASSESSMENT — PAIN SCALES - GENERAL: PAINLEVEL: 0-NO PAIN

## 2024-07-24 ASSESSMENT — ENCOUNTER SYMPTOMS
DEPRESSION: 0
HEMATOLOGIC/LYMPHATIC NEGATIVE: 1
PSYCHIATRIC NEGATIVE: 1
NEUROLOGICAL NEGATIVE: 1
MUSCULOSKELETAL NEGATIVE: 1
GASTROINTESTINAL NEGATIVE: 1
RESPIRATORY NEGATIVE: 1
LOSS OF SENSATION IN FEET: 0
CONSTITUTIONAL NEGATIVE: 1
ENDOCRINE NEGATIVE: 1
OCCASIONAL FEELINGS OF UNSTEADINESS: 0
ALLERGIC/IMMUNOLOGIC NEGATIVE: 1
EYES NEGATIVE: 1
CARDIOVASCULAR NEGATIVE: 1

## 2024-07-24 ASSESSMENT — COLUMBIA-SUICIDE SEVERITY RATING SCALE - C-SSRS
2. HAVE YOU ACTUALLY HAD ANY THOUGHTS OF KILLING YOURSELF?: NO
1. IN THE PAST MONTH, HAVE YOU WISHED YOU WERE DEAD OR WISHED YOU COULD GO TO SLEEP AND NOT WAKE UP?: NO
6. HAVE YOU EVER DONE ANYTHING, STARTED TO DO ANYTHING, OR PREPARED TO DO ANYTHING TO END YOUR LIFE?: NO

## 2024-07-24 NOTE — PROGRESS NOTES
Preventive Cardiology Clinic Note    Reason for Visit: Follow-up visit  Referring Clinician: No ref. provider found     History of Present Illness: Becky Cox is a 54 y.o. female with hyperlipidemia and elevated coronary artery calcium score and family history of premature coronary artery disease who was referred by her PCP for cardiovascular risk discussion and is here for follow-up visit.  Since her initial visit she has done well and is tolerating the next Nexlizet and low-dose aspirin without any adverse effects.  Repeat lipid panel shows excellent reduction in cholesterol with an approximately 50% reduction in LDL cholesterol.  She does not have any exertional chest pain, shortness of breath, palpitations, or syncope.  Her uric acid level was right above the upper limit of normal at 6.8.  She does not have any symptoms of gout.    Past Medical History:  She has a past medical history of Cellulitis, unspecified (09/18/2014), Encounter for screening for malignant neoplasm of cervix (04/04/2016), Mastodynia (09/18/2014), Personal history of other diseases of the respiratory system (10/14/2013), Personal history of other specified conditions (07/27/2015), Personal history of other specified conditions (10/14/2013), Personal history of other specified conditions (02/06/2014), and Personal history of urinary (tract) infections (10/14/2013).    Past Surgical History:  She has a past surgical history that includes Tonsillectomy (12/05/2014); Breast surgery (11/06/2017); Other surgical history (11/13/2018); and Breast surgery (Bilateral, 2017).    Social History:  She reports that she has never smoked. She has never been exposed to tobacco smoke. She has never used smokeless tobacco. She reports current alcohol use of about 3.0 standard drinks of alcohol per week. She reports that she does not use drugs.    Family History:  Family History   Problem Relation Name Age of Onset    Other (htn) Mother       Hyperlipidemia Mother      Heart attack Mother      Blood clot Mother      Osteoporosis Mother      Other (htn) Father      Hyperlipidemia Father      Stroke Father      Heart disease Father      Heart attack Father      Osteoporosis Maternal Grandmother      Stroke Paternal Grandmother      Cancer Paternal Grandfather      Depression Other sibling     Anxiety disorder Other sibling        Allergies:  Codeine, Penicillins, and Sulfa (sulfonamide antibiotics)    Outpatient Medications:  Current Outpatient Medications   Medication Instructions    ascorbic acid (VITAMIN C) 500 mg, oral, Daily    aspirin 81 mg, oral, Daily    bempedoic acid-ezetimibe (Nexlizet) 180-10 mg tablet 1 tablet, oral, Daily    cholecalciferol (Vitamin D3) 5,000 Units tablet 1 tablet, oral, Daily    cyanocobalamin (VITAMIN B-12) 2,500 mcg, oral, Daily    estradiol (ESTRACE) 2 g, vaginal, Daily    fexofenadine (ALLEGRA) 60 mg, oral, Daily    fluticasone (Flonase) 50 mcg/actuation nasal spray 1 spray, Each Nostril, Daily, Shake gently. Before first use, prime pump. After use, clean tip and replace cap.    ibuprofen (ADVIL) 200 mg, oral, Every 6 hours PRN    lactobacillus combination no.4 (Probiotic) 3 billion cell capsule oral    magnesium glycinate-mag oxide 120 mg magnesium capsule 1 capsule, oral, Daily    omega-3 (Fish Oil) 60- mg capsule 1,000 mg, oral, Daily    triamcinolone (Kenalog) 0.1 % cream Topical, 3 times daily PRN    turmeric root extract 500 mg capsule 1 Capful, oral, Daily    ubidecarenone (COENZYME Q10, BULK, MISC) 1 capsule, oral, Daily    vit C/zinc citrate/elderberry (ELDERBERRY IMMUNE HEALTH ORAL) 1 capsule, oral, Daily       Review of Systems:  Review of Systems   Constitutional: Negative.   HENT: Negative.     Eyes: Negative.    Cardiovascular: Negative.    Respiratory: Negative.     Endocrine: Negative.    Hematologic/Lymphatic: Negative.    Skin: Negative.    Musculoskeletal: Negative.    Gastrointestinal: Negative.     Genitourinary: Negative.    Neurological: Negative.    Psychiatric/Behavioral: Negative.     Allergic/Immunologic: Negative.        Last Recorded Vitals:  Vitals:    07/24/24 1142   BP: 120/82   BP Location: Right arm   Patient Position: Sitting   BP Cuff Size: Adult   Pulse: 73   SpO2: 97%   Weight: 63.2 kg (139 lb 6.1 oz)   Height: 1.524 m (5')       Physical Examination:  General: Well appearing, well-nourished, in no acute distress.  HEENT: Normocephalic atraumatic, pupils equal and reactive to light, extraocular muscles intact, no conjunctival injection, oropharynx clear without exudates.  Neck: Normal carotid arterial pulses, no arterial bruits, no thyromegaly.  Cardiac: Regular rhythm and normal heart rate.  S1, S2 present and normal.  No murmurs, rubs, or gallops.  PMI is nondisplaced. Jugular venous pulsations are normal.  Pulmonary: Normal breath sounds, no increased work of breathing, no wheezes or crackles.  GI: Normal bowel sounds, abdominal aorta not enlarged, no hepatosplenomegaly, no abdominal bruits.  Lower extremities: No cyanosis, clubbing, or edema.  No xanthelasma present. Normal distal pulses.  Skin: Skin intact. No significant rashes or lesions present.  Neuro: Alert and oriented x 3, normal attention and cognition, no focal motor or sensory neurologic deficits.  Psych: Normal affect and mood.  Musculoskeletal: Normal gait normal muscle tone.    Laboratory Studies:  Lab Results   Component Value Date    GLUCOSE 99 09/25/2023    CALCIUM 9.7 09/25/2023     09/25/2023    K 4.3 09/25/2023    CO2 25 09/25/2023     09/25/2023    BUN 15 09/25/2023    CREATININE 0.92 09/25/2023     Lab Results   Component Value Date    ALT 21 09/25/2023    AST 17 09/25/2023    ALKPHOS 69 09/25/2023    BILITOT 0.6 09/25/2023     Results from last 7 days   Lab Units 07/18/24  0928   CHOLESTEROL mg/dL 147   TRIGLYCERIDES mg/dL 142   HDL mg/dL 39.4     Lab Results   Component Value Date    CHOL 147  "07/18/2024    CHOL 231 (H) 09/25/2023    CHOL 226 (H) 06/20/2018     Lab Results   Component Value Date    HDL 39.4 07/18/2024    HDL 41.5 09/25/2023    HDL 39.1 (A) 06/20/2018     Lab Results   Component Value Date    LDLCALC 79 07/18/2024     Lab Results   Component Value Date    TRIG 142 07/18/2024    TRIG 121 09/25/2023    TRIG 137 06/20/2018     No components found for: \"CHOLHDL\"  Lab Results   Component Value Date    HGBA1C 5.1 09/25/2023     Uric acid 6.8 (ULN 6.7)    Cardiac Imaging:  CT cardiac scoring wo IV contrast 09/22/2023  Total   225.02     Assessment and Plan:  Problem List Items Addressed This Visit          Cardiac and Vasculature    Mixed hyperlipidemia - Primary     Other Visit Diagnoses       Elevated coronary artery calcium score        Statin intolerance              Becky Cox is a 54 y.o. female with hyperlipidemia and elevated coronary artery calcium score and family history of premature coronary artery disease who was referred by her PCP for cardiovascular risk discussion and is here for follow-up visit.  She remains asymptomatic from a cardiovascular standpoint.  She has had an approximately 50% reduction in LDL cholesterol with the addition of Nexlizet with acceptable uric acid level and no evidence of gout.  I recommend continuing the medication at the current dose for continued risk reduction.  I also recommend continuing low-dose aspirin for primary prevention as well.  We discussed focusing on lifestyle factors including dietary change and increasing physical activity for optimal risk reduction.  I will see her again in 1 year here in the office for routine follow-up.  Please do not hesitate to call or contact me with any questions or concerns.    Asohk Nielson MD, FAHA, [facility]  Director,  [facility]  Director,  [facility]  Associate Professor of Medicine  [employer]      "

## 2025-05-12 ENCOUNTER — TELEPHONE (OUTPATIENT)
Dept: PRIMARY CARE | Facility: CLINIC | Age: 55
End: 2025-05-12
Payer: COMMERCIAL

## 2025-05-16 ENCOUNTER — OFFICE VISIT (OUTPATIENT)
Dept: PRIMARY CARE | Facility: CLINIC | Age: 55
End: 2025-05-16
Payer: COMMERCIAL

## 2025-05-16 VITALS
SYSTOLIC BLOOD PRESSURE: 120 MMHG | HEIGHT: 60 IN | OXYGEN SATURATION: 99 % | WEIGHT: 139 LBS | HEART RATE: 60 BPM | RESPIRATION RATE: 16 BRPM | BODY MASS INDEX: 27.29 KG/M2 | TEMPERATURE: 98.2 F | DIASTOLIC BLOOD PRESSURE: 80 MMHG

## 2025-05-16 DIAGNOSIS — Z23 IMMUNIZATION DUE: ICD-10-CM

## 2025-05-16 PROCEDURE — 99024 POSTOP FOLLOW-UP VISIT: CPT | Performed by: FAMILY MEDICINE

## 2025-05-16 PROCEDURE — 90715 TDAP VACCINE 7 YRS/> IM: CPT | Performed by: FAMILY MEDICINE

## 2025-05-16 PROCEDURE — 3008F BODY MASS INDEX DOCD: CPT | Performed by: FAMILY MEDICINE

## 2025-05-16 PROCEDURE — 90471 IMMUNIZATION ADMIN: CPT | Performed by: FAMILY MEDICINE

## 2025-07-11 ENCOUNTER — TELEPHONE (OUTPATIENT)
Dept: PRIMARY CARE | Facility: CLINIC | Age: 55
End: 2025-07-11

## 2025-07-11 ENCOUNTER — TELEMEDICINE (OUTPATIENT)
Dept: PRIMARY CARE | Facility: CLINIC | Age: 55
End: 2025-07-11
Payer: COMMERCIAL

## 2025-07-11 DIAGNOSIS — L23.7 POISON IVY DERMATITIS: Primary | ICD-10-CM

## 2025-07-11 PROCEDURE — 99214 OFFICE O/P EST MOD 30 MIN: CPT | Performed by: FAMILY MEDICINE

## 2025-07-11 RX ORDER — PREDNISONE 10 MG/1
TABLET ORAL
Qty: 20 TABLET | Refills: 0 | Status: SHIPPED | OUTPATIENT
Start: 2025-07-11 | End: 2025-07-23

## 2025-07-11 RX ORDER — CEPHALEXIN 500 MG/1
500 CAPSULE ORAL 2 TIMES DAILY
Qty: 14 CAPSULE | Refills: 0 | Status: SHIPPED | OUTPATIENT
Start: 2025-07-11 | End: 2025-07-18

## 2025-07-11 NOTE — PROGRESS NOTES
Subjective   Becky Cox is a 55 y.o. female who presents for No chief complaint on file..  HPI    Rash x 1 wk. Very itchy but getting worse spreading from arm to legs/abd/chest. Now painful. No fever chills. Fibro slightly worse.   Using OTC w out improvement     An interactive audio and video telecommunication system which permits real time communications between the patient  (at the originating site) and provider (at the distant site) was utilized to provide this telehealth service. Verbal consent was requested and obtained today for a telehealth visit.    Medications Ordered Prior to Encounter[1]               Objective   There were no vitals taken for this visit.   Physical Exam  General: NAD  HEENT:NCAT  Lungs: no audible wheeze or rhonchi   Skin: R forearm/Leg w erythematous circular lesions w faintly erythematous scattered papules on abd/chest   Psych: cooperative, appropriate affect  Neuro: speech clear. A&Ox3    Assessment/Plan   Problem List Items Addressed This Visit    None  Visit Diagnoses         Poison ivy dermatitis    -  Primary  Poison ivy w secondary infection  Start keflex (PCN all noted but since only rash ok for cef)  Start pred taper       Relevant Medications    cephalexin (Keflex) 500 mg capsule    predniSONE (Deltasone) 10 mg tablet                 [1]   Current Outpatient Medications on File Prior to Visit   Medication Sig Dispense Refill    ascorbic acid (Vitamin C) 500 mg tablet Take 1 tablet (500 mg) by mouth once daily.      bempedoic acid-ezetimibe (Nexlizet) 180-10 mg tablet Take 1 tablet by mouth once daily. 90 tablet 3    cholecalciferol (Vitamin D3) 5,000 Units tablet Take 1 tablet (5,000 Units) by mouth once daily.      cyanocobalamin (Vitamin B-12) 2,500 mcg tablet Take 1 tablet (2,500 mcg) by mouth once daily.      estradiol (Estrace) 0.01 % (0.1 mg/gram) vaginal cream Insert 0.5 Applicatorfuls (2 g) into the vagina once daily. 42.5 g 3    fexofenadine (Allegra) 60 mg  tablet Take 1 tablet (60 mg) by mouth once daily.      fluticasone (Flonase) 50 mcg/actuation nasal spray Administer 1 spray into each nostril once daily. Shake gently. Before first use, prime pump. After use, clean tip and replace cap.      ibuprofen (AdviL) 200 mg tablet Take 1 tablet (200 mg) by mouth every 6 hours if needed for mild pain (1 - 3).      lactobacillus combination no.4 (Probiotic) 3 billion cell capsule Take by mouth.      magnesium glycinate-mag oxide 120 mg magnesium capsule Take 1 capsule by mouth once daily.      omega-3 (Fish Oil) 60- mg capsule Take 2 capsules (1,000 mg) by mouth once daily.      triamcinolone (Kenalog) 0.1 % cream Apply topically 3 times a day as needed for rash. (Patient not taking: Reported on 7/24/2024) 30 g 1    turmeric root extract 500 mg capsule Take 1 Capful by mouth once daily.      ubidecarenone (COENZYME Q10, BULK, MISC) Take 1 capsule by mouth once daily.      vit C/zinc citrate/elderberry (ELDERBERRY IMMUNE HEALTH ORAL) Take 1 capsule by mouth once daily.       No current facility-administered medications on file prior to visit.

## 2025-07-11 NOTE — TELEPHONE ENCOUNTER
Patient called in with poison ivy all over body since last Saturday, 1 week getting worse tried everything OTC, has hx of Fibromyalgia and in a lot of pain triggering her pain, Advise?

## 2025-07-27 DIAGNOSIS — Z78.9 STATIN INTOLERANCE: ICD-10-CM

## 2025-07-27 DIAGNOSIS — R93.1 ELEVATED CORONARY ARTERY CALCIUM SCORE: ICD-10-CM

## 2025-07-27 DIAGNOSIS — E78.2 MIXED HYPERLIPIDEMIA: ICD-10-CM

## 2025-07-28 RX ORDER — BEMPEDOIC ACID AND EZETIMIBE 180; 10 MG/1; MG/1
1 TABLET, FILM COATED ORAL DAILY
Qty: 90 TABLET | Refills: 3 | Status: SHIPPED | OUTPATIENT
Start: 2025-07-28